# Patient Record
(demographics unavailable — no encounter records)

---

## 2025-04-17 NOTE — HISTORY OF PRESENT ILLNESS
[Gestational Age: ___] : Gestational Age: [unfilled] [Chronological Age: ___] : Chronological Age: [unfilled] [Corrected Age: ___] : Corrected Age: [unfilled] [Date of D/C: ___] : Date of D/C: [unfilled] [Developmental Pediatrics: ___] : Developmental Pediatrics: [unfilled] [Ophthalmology: ___] : Ophthalmology: [unfilled] [Car seat use according to directions] : car seat used according to directions [Weight Gain Since Last Visit (oz/days) ___] : weight gain since last visit: [unfilled] (oz/days)  [___Formula] : [unfilled] [___ ounces/feeding] : ~BRANDI rebolledo/feeding [Every ___ hours] : every [unfilled] hours [_____ Times Per] : Stool frequency occurs [unfilled] times per  [Week] : week [Variable amount] : variable  [Solid Foods] : no solid food at this time [Bloody] : not bloody [Mucousy] : no mucous [de-identified] : d/c [facility] [place] Mother reports baby has been doing well since NICU discharge. Has seen PMD once after hospital discharge, with next appointment in a few weeks for follow up.  Concerns today include gassiness with intermittent constipation, as well as dry skin.  No intercurrent illness.    [de-identified] : NRE score 5.  No EI recommended on discharge gets tummy time, lifts head, looks at face  [de-identified] : None [de-identified] : US for Hips 5/1/25 [de-identified] : NBS with slightly elevated 17 alpha-hydroxyprogesterone.  Conf 17- ng/dL on 3/26/25 [de-identified] : back to sleep [de-identified] : Intermittent hard/paste-like, associated with straining [de-identified] : n/a [de-identified] : n/a [de-identified] : n/a [de-identified] : n/a

## 2025-04-17 NOTE — BIRTH HISTORY
[Birthweight ___ kg] : weight [unfilled] kg [Weight ___ kg] : weight [unfilled] kg [Length ___ cm] : length [unfilled] cm [Head Circumference ___ cm] : head circumference [unfilled] cm [EHM: ___] : EHM: [unfilled] [Formula: ____] : formula: [unfilled] [de-identified] : 30 wk di/di twin B born via c/s and breech to a 36 yo  mother.  Given CPAP in the DR and admitted to NICU on CPAP.  Apgars 7, 8 [de-identified] : RESPIRATORY: RDS. S/p bCPAP. BEYFORTUS 3/25/25. apnea of prematurity, s/p caffeine until 25 CARDIOVASCULAR: Fetal echo wnl. Access history (Type and location): PICC placed -  for nutrition. FEN /GI/Surgical: FE/Enfacare 22kcal POAL HEMATOLOGY:  PRBC Transfusion [ _ ] Yes [ x ] No Platelets transfusion: [ _ ] Yes [ x ] No Phototherapy: Phototherapy given -. Bilirubin stable. G-6PD 15.1 () ENDO: Abnormal  screen result from 25 with c/f congenital adrenal hyperplasia due to slightly elevated 17 alpha-hydroxyprogesterone. Confirmatory 17-OHP level sent 3/26/25 139 ng/dL. ID issues/Septic episodes: Admission BCx. NGTD. S/P Amp/Gent. Neuro:  Last Head US , showing no ICH. ND NRE score 5 and no EI. F/u 6 months. Ophtho: ROP exam 3/17 - S0 Z3 outpatient ophthalmology F/U 6 months Ortho: Breech presentation at birth, recommend hip ultrasound at 44-46 weeks PMA. Immunizations: Received Prevnar (PCV-20) and Pentacel ([id number]) Date: 3/27/25. Received Hepatitis B Date: 3/28/25

## 2025-04-17 NOTE — PATIENT INSTRUCTIONS
[Verbal patient instructions provided] : Verbal patient instructions provided. [FreeTextEntry1] : Return to NICU GRAD clinic on 6/26 at 12:45 PM Needs  Developmental  pediatrics  appt  in  OCTOBER         ( at 6 months Corrected Age )     phone [phone] [FreeTextEntry2] : yes [FreeTextEntry3] : none [FreeTextEntry4] : continue Enfacare 22kcal/oz  [FreeTextEntry5] : continue multivitamin, discontinue iron [FreeTextEntry6] : n/a [FreeTextEntry7] : n/a [FreeTextEntry8] : per PMD [de-identified] : received Beyfortus 3/25 [de-identified] : Aquaphor for skin during winter months  / Aquaphor for skin , avoid  direct sun exposure during summer months [FreeTextEntry9] : n/a [de-identified] : HIP US - obtain around 5/01 [de-identified] : none

## 2025-04-17 NOTE — DISCUSSION/SUMMARY
[GA at Birth: ___] : GA at Birth: [unfilled] [Chronological Age: ___] : Chronological Age: [unfilled] [Corrected Age: ___] : Corrected Age: [unfilled] [Alert] : alert [Quiet] : quiet [Asymmetrical Tonic Neck Reflex (1-3 months)] : asymmetrical tonic neck reflex (1-3 months) [Turns head to both sides (0-2 months)] : turns head to both sides (0-2 months) [Moves extremities equally] : moves extremities equally [Moves against gravity] : moves against gravity [Hands to midline (0-3 months)] : hands to midline (0-3 months) [Turns head side to side] : turns head side to side [Lifts head (45 deg 0-2 mon, 90 deg 1-3 mon)] : lifts head (45 degrees 0-2 months, 90 degrees 1-3 months) [Assist] : sidelying to supine (1.5 - 2 months) - Assist [Passive] : prone to supine (2- 5 months) - Passive [Head mid line] : Head lag (0-2 months) - head in mid line [Fair] : head control is fair [<] : < [Focusing (2 months)] : focusing (2 months) [Tracking (2 months)] : tracking (2 months) [] : no [Supine] : supine [Prone] : prone [Sidelying] : sidelying [FreeTextEntry1] : Prematurity, di-di twin birth, breech  [FreeTextEntry2] : Excessive gassiness  [FreeTextEntry5] : +R c/s rotation preference, no ROM tightness, no plagiocephally noted  [FreeTextEntry3] : Infant seen this am in  followup clinic with infant's mother. Presents with overall strength, ROM, tone, and GM skills appropriate for corrected age. Demonstrates age appropriate state regulation skills.  Reviewed MLO, visual activities, auditory stim, positioning in supine, awake tummy time, awake SL R/L.  Mother provided with handouts with good understanding in addition to good return demo of activities to facilitate gas relief. Emphasized importance of supervised tummy time on floor to facilitate gas relief as pt currently only gets tummy time on mother or on a bed. No EI recommended at this time. Follow up at NICU clinic as per MD ndiaye.

## 2025-04-17 NOTE — REVIEW OF SYSTEMS
[Immunizations are up to date] : Immunizations are up to date [RSV prophylaxis received] : RSV prophylaxis received [Dry Skin] : ~L dry skin [Fatigue] : no fatigue [Fever] : no fever [Decreased Appetite] : no decrease in appetite [Eye Discharge] : no eye discharge [Eye Redness] : no redness [Redness Of Eyelid] : no redness of ~T eyelid [Puffy Eyelids] : no puffy ~T eyelids [Icteric] : were not ~L icteric [Oral Thrush] : no oral thrush [Rhinorrhea] : no rhinorrhea [Sneezing] : no sneezing [Hoarseness] : no hoarseness [Mouth Sores] : no mouth sores [Cyanosis] : no cyanosis [Edema] : no edema [Diaphoresis] : not diaphoretic [Fatigue with Feeding] : no fatigue with feeding [Difficulty Breathing] : no dyspnea [Cough] : no cough [Wheezing] : no wheezing [Vomiting] : no vomiting [Diarrhea] : no diarrhea [Arching with Feeds] : no arching with feeds [Regurgitation] : no regurgitation [Seizure] : no seizures [Joint Swelling] : no joint swelling [Abnormal Movements] : no abnormal movements [Dec Urine Output] : no oliguria [Swelling in Scrotum] : no swelling in scrotum [Urticaria] : no urticaria [Yellow Skin Color] : skin not yellow [Pale Skin Color] : skin is not pale [Rash] : no rash [Blood in Stools] : no blood in stools [Skin Rash] : no skin rash

## 2025-04-17 NOTE — PATIENT INSTRUCTIONS
[Verbal patient instructions provided] : Verbal patient instructions provided. [FreeTextEntry1] : Return to NICU GRAD clinic on 6/26 at 12:45 PM Needs  Developmental  pediatrics  appt  in  OCTOBER         ( at 6 months Corrected Age )     phone [phone] [FreeTextEntry2] : yes [FreeTextEntry3] : none [FreeTextEntry4] : continue Enfacare 22kcal/oz  [FreeTextEntry5] : continue multivitamin, discontinue iron [FreeTextEntry6] : n/a [FreeTextEntry7] : n/a [FreeTextEntry8] : per PMD [de-identified] : received Beyfortus 3/25 [FreeTextEntry9] : n/a [de-identified] : Aquaphor for skin during winter months  / Aquaphor for skin , avoid  direct sun exposure during summer months [de-identified] : HIP US - obtain around 5/01 [de-identified] : none

## 2025-04-17 NOTE — BIRTH HISTORY
[Birthweight ___ kg] : weight [unfilled] kg [Weight ___ kg] : weight [unfilled] kg [Length ___ cm] : length [unfilled] cm [Head Circumference ___ cm] : head circumference [unfilled] cm [EHM: ___] : EHM: [unfilled] [Formula: ____] : formula: [unfilled] [de-identified] : 30 wk di/di twin B born via c/s and breech to a 36 yo  mother.  Given CPAP in the DR and admitted to NICU on CPAP.  Apgars 7, 8 [de-identified] : RESPIRATORY: RDS. S/p bCPAP. BEYFORTUS 3/25/25. apnea of prematurity, s/p caffeine until 25 CARDIOVASCULAR: Fetal echo wnl. Access history (Type and location): PICC placed -  for nutrition. FEN /GI/Surgical: FE/Enfacare 22kcal POAL HEMATOLOGY:  PRBC Transfusion [ _ ] Yes [ x ] No Platelets transfusion: [ _ ] Yes [ x ] No Phototherapy: Phototherapy given -. Bilirubin stable. G-6PD 15.1 () ENDO: Abnormal  screen result from 25 with c/f congenital adrenal hyperplasia due to slightly elevated 17 alpha-hydroxyprogesterone. Confirmatory 17-OHP level sent 3/26/25 139 ng/dL. ID issues/Septic episodes: Admission BCx. NGTD. S/P Amp/Gent. Neuro:  Last Head US , showing no ICH. ND NRE score 5 and no EI. F/u 6 months. Ophtho: ROP exam 3/17 - S0 Z3 outpatient ophthalmology F/U 6 months Ortho: Breech presentation at birth, recommend hip ultrasound at 44-46 weeks PMA. Immunizations: Received Prevnar (PCV-20) and Pentacel ([id number]) Date: 3/27/25. Received Hepatitis B Date: 3/28/25

## 2025-04-17 NOTE — REVIEW OF SYSTEMS
[Immunizations are up to date] : Immunizations are up to date [RSV prophylaxis received] : RSV prophylaxis received [Dry Skin] : ~L dry skin [Fatigue] : no fatigue [Fever] : no fever [Decreased Appetite] : no decrease in appetite [Eye Discharge] : no eye discharge [Eye Redness] : no redness [Redness Of Eyelid] : no redness of ~T eyelid [Puffy Eyelids] : no puffy ~T eyelids [Icteric] : were not ~L icteric [Oral Thrush] : no oral thrush [Rhinorrhea] : no rhinorrhea [Sneezing] : no sneezing [Hoarseness] : no hoarseness [Mouth Sores] : no mouth sores [Cyanosis] : no cyanosis [Edema] : no edema [Diaphoresis] : not diaphoretic [Fatigue with Feeding] : no fatigue with feeding [Difficulty Breathing] : no dyspnea [Cough] : no cough [Wheezing] : no wheezing [Vomiting] : no vomiting [Diarrhea] : no diarrhea [Arching with Feeds] : no arching with feeds [Regurgitation] : no regurgitation [Seizure] : no seizures [Joint Swelling] : no joint swelling [Abnormal Movements] : no abnormal movements [Dec Urine Output] : no oliguria [Swelling in Scrotum] : no swelling in scrotum [Urticaria] : no urticaria [Yellow Skin Color] : skin not yellow [Pale Skin Color] : skin is not pale [Rash] : no rash [Skin Rash] : no skin rash [Blood in Stools] : no blood in stools

## 2025-04-17 NOTE — PHYSICAL EXAM
[Pink] : pink [Well Perfused] : well perfused [Conjunctiva Clear] : conjunctiva clear [Red Reflex Present] : red reflex present [PERRL] : pupils were equal, round, reactive to light  [Nares Patent] : nares patent [Ears Normal Position and Shape] : normal position and shape of ears [No Nasal Flaring] : no nasal flaring [Moist and Pink Mucous Membranes] : moist and pink mucous membranes [Palate Intact] : palate intact [No Torticollis] : no torticollis [No Neck Masses] : no neck masses [Symmetric Expansion] : symmetric chest expansion [No Retractions] : no retractions [Clear to Auscultation] : lungs clear to auscultation  [Normal S1, S2] : normal S1 and S2 [Regular Rhythm] : regular rhythm [No Murmur] : no mumur [Normal Pulses] : normal pulses [Non Distended] : non distended [No HSM] : no hepatosplenomegaly appreciated [No Masses] : no masses were palpated [Normal Bowel Sounds] : normal bowel sounds [No Umbilical Hernia] : no umbilical hernia [Normal Genitalia] : normal genitalia [No Sacral Dimples] : no sacral dimples [No Scoliosis] : no scoliosis [Normal Range of Motion] : normal range of motion [Normal Posture] : normal posture [No evidence of Hip Dislocation] : no evidence of hip dislocation [Active and Alert] : active and alert [Normal muscle tone] : normal muscle tone of all extremites [Normal truncal tone] : normal truncal tone [Normal deep tendon reflexes] : normal deep tendon reflexes [Symmetric Sujit] : the [place] reflex was ~L present [Palmar Grasp] : the palmar grasp reflex was ~L present [Plantar Grasp] : the plantar grasp reflex was ~L present [Strong Suck] : the strong sucking reflex was ~L present [Rooting] : the rooting reflex was ~L present [ATNR] : tonic neck reflex was ~L asymmetrical [Fixes On Faces] : fixes on faces [Follows to Midline] : the gaze follows to the midline [Follows Past Midline] : the gaze follows past the midline [Turns Head Side to Side in Prone] : turns head side to side in prone [Lifts Head And Chest 30 degress in Prone] : lifts the head and chest 30 degress in prone [Lifts Head And Chest 45 degress in Prone] : lifts the head and chest 45 degress in prone [Follows 180 Degrees] : visual track 180 degrees not achieved [Weight Shifts in Prone] : does not weight shift in prone [Reaches For Objects in Prone] : does not reach for objects in prone [Hands Open] : the hands open [Reaches for Objects] : does not reach for objects [Swats at Objects] : does not swat at objects [de-identified] : dry skin across extremities [de-identified] : circumcised, testes descended bilaterally [de-identified] : + head lag (age-appropriate) , rightward head preference but no plagio or torticollis , able to look to left as well

## 2025-04-17 NOTE — HISTORY OF PRESENT ILLNESS
[Gestational Age: ___] : Gestational Age: [unfilled] [Chronological Age: ___] : Chronological Age: [unfilled] [Corrected Age: ___] : Corrected Age: [unfilled] [Date of D/C: ___] : Date of D/C: [unfilled] [Developmental Pediatrics: ___] : Developmental Pediatrics: [unfilled] [Ophthalmology: ___] : Ophthalmology: [unfilled] [Car seat use according to directions] : car seat used according to directions [Weight Gain Since Last Visit (oz/days) ___] : weight gain since last visit: [unfilled] (oz/days)  [___Formula] : [unfilled] [___ ounces/feeding] : ~BRANDI rebolledo/feeding [Every ___ hours] : every [unfilled] hours [_____ Times Per] : Stool frequency occurs [unfilled] times per  [Week] : week [Variable amount] : variable  [Solid Foods] : no solid food at this time [Bloody] : not bloody [Mucousy] : no mucous [de-identified] : d/c [facility] Mother reports baby has been doing well since NICU discharge. Has seen PMD once after hospital discharge, with next appointment in a few weeks for follow up.  Concerns today include gassiness with intermittent constipation, as well as dry skin.  No intercurrent illness.    [de-identified] : NRE score 5.  No EI recommended on discharge gets tummy time, lifts head, looks at face  [de-identified] : None [de-identified] : NBS with slightly elevated 17 alpha-hydroxyprogesterone.  Conf 17- ng/dL on 3/26/25 [de-identified] : US for Hips 5/1/25 [de-identified] : back to sleep [de-identified] : Intermittent hard/paste-like, associated with straining [de-identified] : n/a [de-identified] : n/a [de-identified] : n/a [de-identified] : n/a

## 2025-04-17 NOTE — HISTORY OF PRESENT ILLNESS
[Gestational Age: ___] : Gestational Age: [unfilled] [Chronological Age: ___] : Chronological Age: [unfilled] [Corrected Age: ___] : Corrected Age: [unfilled] [Date of D/C: ___] : Date of D/C: [unfilled] [Developmental Pediatrics: ___] : Developmental Pediatrics: [unfilled] [Ophthalmology: ___] : Ophthalmology: [unfilled] [Car seat use according to directions] : car seat used according to directions [Weight Gain Since Last Visit (oz/days) ___] : weight gain since last visit: [unfilled] (oz/days)  [___Formula] : [unfilled] [___ ounces/feeding] : ~BRANDI rebolledo/feeding [Every ___ hours] : every [unfilled] hours [_____ Times Per] : Stool frequency occurs [unfilled] times per  [Week] : week [Variable amount] : variable  [Solid Foods] : no solid food at this time [Bloody] : not bloody [Mucousy] : no mucous [de-identified] : d/c [facility] [place] Mother reports baby has been doing well since NICU discharge. Has seen PMD once after hospital discharge, with next appointment in a few weeks for follow up.  Concerns today include gassiness with intermittent constipation, as well as dry skin.  No intercurrent illness.    [de-identified] : NRE score 5.  No EI recommended on discharge gets tummy time, lifts head, looks at face  [de-identified] : None [de-identified] : US for Hips 5/1/25 [de-identified] : NBS with slightly elevated 17 alpha-hydroxyprogesterone.  Conf 17- ng/dL on 3/26/25 [de-identified] : back to sleep [de-identified] : Intermittent hard/paste-like, associated with straining [de-identified] : n/a [de-identified] : n/a [de-identified] : n/a [de-identified] : n/a

## 2025-04-17 NOTE — HISTORY OF PRESENT ILLNESS
[Gestational Age: ___] : Gestational Age: [unfilled] [Chronological Age: ___] : Chronological Age: [unfilled] [Corrected Age: ___] : Corrected Age: [unfilled] [Date of D/C: ___] : Date of D/C: [unfilled] [Developmental Pediatrics: ___] : Developmental Pediatrics: [unfilled] [Ophthalmology: ___] : Ophthalmology: [unfilled] [Car seat use according to directions] : car seat used according to directions [Weight Gain Since Last Visit (oz/days) ___] : weight gain since last visit: [unfilled] (oz/days)  [___Formula] : [unfilled] [___ ounces/feeding] : ~BRANDI rebolledo/feeding [Every ___ hours] : every [unfilled] hours [_____ Times Per] : Stool frequency occurs [unfilled] times per  [Week] : week [Variable amount] : variable  [Solid Foods] : no solid food at this time [Bloody] : not bloody [Mucousy] : no mucous [de-identified] : d/c [facility] [place] Mother reports baby has been doing well since NICU discharge. Has seen PMD once after hospital discharge, with next appointment in a few weeks for follow up.  Concerns today include gassiness with intermittent constipation, as well as dry skin.  No intercurrent illness.    [de-identified] : NRE score 5.  No EI recommended on discharge gets tummy time, lifts head, looks at face  [de-identified] : None [de-identified] : US for Hips 5/1/25 [de-identified] : NBS with slightly elevated 17 alpha-hydroxyprogesterone.  Conf 17- ng/dL on 3/26/25 [de-identified] : back to sleep [de-identified] : Intermittent hard/paste-like, associated with straining [de-identified] : n/a [de-identified] : n/a [de-identified] : n/a [de-identified] : n/a

## 2025-04-17 NOTE — BIRTH HISTORY
[Birthweight ___ kg] : weight [unfilled] kg [Weight ___ kg] : weight [unfilled] kg [Length ___ cm] : length [unfilled] cm [Head Circumference ___ cm] : head circumference [unfilled] cm [EHM: ___] : EHM: [unfilled] [Formula: ____] : formula: [unfilled] [de-identified] : 30 wk di/di twin B born via c/s and breech to a 38 yo  mother.  Given CPAP in the DR and admitted to NICU on CPAP.  Apgars 7, 8 [de-identified] : RESPIRATORY: RDS. S/p bCPAP. BEYFORTUS 3/25/25. apnea of prematurity, s/p caffeine until 25 CARDIOVASCULAR: Fetal echo wnl. Access history (Type and location): PICC placed -  for nutrition. FEN /GI/Surgical: FE/Enfacare 22kcal POAL HEMATOLOGY:  PRBC Transfusion [ _ ] Yes [ x ] No Platelets transfusion: [ _ ] Yes [ x ] No Phototherapy: Phototherapy given -. Bilirubin stable. G-6PD 15.1 () ENDO: Abnormal  screen result from 25 with c/f congenital adrenal hyperplasia due to slightly elevated 17 alpha-hydroxyprogesterone. Confirmatory 17-OHP level sent 3/26/25 139 ng/dL. ID issues/Septic episodes: Admission BCx. NGTD. S/P Amp/Gent. Neuro:  Last Head US , showing no ICH. ND NRE score 5 and no EI. F/u 6 months. Ophtho: ROP exam 3/17 - S0 Z3 outpatient ophthalmology F/U 6 months Ortho: Breech presentation at birth, recommend hip ultrasound at 44-46 weeks PMA. Immunizations: Received Prevnar (PCV-20) and Pentacel ([id number]) Date: 3/27/25. Received Hepatitis B Date: 3/28/25

## 2025-04-17 NOTE — PHYSICAL EXAM
[Pink] : pink [Well Perfused] : well perfused [Conjunctiva Clear] : conjunctiva clear [Red Reflex Present] : red reflex present [PERRL] : pupils were equal, round, reactive to light  [Nares Patent] : nares patent [Ears Normal Position and Shape] : normal position and shape of ears [No Nasal Flaring] : no nasal flaring [Moist and Pink Mucous Membranes] : moist and pink mucous membranes [Palate Intact] : palate intact [No Torticollis] : no torticollis [No Neck Masses] : no neck masses [Symmetric Expansion] : symmetric chest expansion [No Retractions] : no retractions [Clear to Auscultation] : lungs clear to auscultation  [Normal S1, S2] : normal S1 and S2 [Regular Rhythm] : regular rhythm [No Murmur] : no mumur [Normal Pulses] : normal pulses [Non Distended] : non distended [No HSM] : no hepatosplenomegaly appreciated [No Masses] : no masses were palpated [Normal Bowel Sounds] : normal bowel sounds [No Umbilical Hernia] : no umbilical hernia [Normal Genitalia] : normal genitalia [No Sacral Dimples] : no sacral dimples [No Scoliosis] : no scoliosis [Normal Range of Motion] : normal range of motion [Normal Posture] : normal posture [No evidence of Hip Dislocation] : no evidence of hip dislocation [Active and Alert] : active and alert [Normal muscle tone] : normal muscle tone of all extremites [Normal truncal tone] : normal truncal tone [Normal deep tendon reflexes] : normal deep tendon reflexes [Symmetric Sujit] : the [place] reflex was ~L present [Palmar Grasp] : the palmar grasp reflex was ~L present [Plantar Grasp] : the plantar grasp reflex was ~L present [Strong Suck] : the strong sucking reflex was ~L present [Rooting] : the [place] reflex was ~L present [ATNR] : tonic neck reflex was ~L asymmetrical [Fixes On Faces] : fixes on faces [Follows to Midline] : the gaze follows to the midline [Follows Past Midline] : the gaze follows past the midline [Turns Head Side to Side in Prone] : turns head side to side in prone [Lifts Head And Chest 30 degress in Prone] : lifts the head and chest 30 degress in prone [Lifts Head And Chest 45 degress in Prone] : lifts the head and chest 45 degress in prone [Follows 180 Degrees] : visual track 180 degrees not achieved [Weight Shifts in Prone] : does not weight shift in prone [Reaches For Objects in Prone] : does not reach for objects in prone [Hands Open] : the hands open [Reaches for Objects] : does not reach for objects [Swats at Objects] : does not swat at objects [de-identified] : dry skin across extremities [de-identified] : circumcised, testes descended bilaterally [de-identified] : + head lag (age-appropriate) , rightward head preference but no plagio or torticollis , able to look to left as well

## 2025-04-17 NOTE — REASON FOR VISIT
[F/U - Hospitalization] : follow-up of a recent hospitalization for [Chronic Lung Disease] : chronic lung disease [Developmental Delay] : developmental delay [Mother] : mother

## 2025-04-17 NOTE — PHYSICAL EXAM
[Pink] : pink [Well Perfused] : well perfused [Conjunctiva Clear] : conjunctiva clear [Red Reflex Present] : red reflex present [PERRL] : pupils were equal, round, reactive to light  [Nares Patent] : nares patent [Ears Normal Position and Shape] : normal position and shape of ears [Moist and Pink Mucous Membranes] : moist and pink mucous membranes [No Nasal Flaring] : no nasal flaring [Palate Intact] : palate intact [No Torticollis] : no torticollis [No Neck Masses] : no neck masses [Symmetric Expansion] : symmetric chest expansion [No Retractions] : no retractions [Clear to Auscultation] : lungs clear to auscultation  [Normal S1, S2] : normal S1 and S2 [Regular Rhythm] : regular rhythm [No Murmur] : no mumur [Normal Pulses] : normal pulses [Non Distended] : non distended [No HSM] : no hepatosplenomegaly appreciated [No Masses] : no masses were palpated [Normal Bowel Sounds] : normal bowel sounds [No Umbilical Hernia] : no umbilical hernia [Normal Genitalia] : normal genitalia [No Sacral Dimples] : no sacral dimples [No Scoliosis] : no scoliosis [Normal Range of Motion] : normal range of motion [Normal Posture] : normal posture [No evidence of Hip Dislocation] : no evidence of hip dislocation [Active and Alert] : active and alert [Normal muscle tone] : normal muscle tone of all extremites [Normal truncal tone] : normal truncal tone [Normal deep tendon reflexes] : normal deep tendon reflexes [Symmetric Sujit] : the [place] reflex was ~L present [Palmar Grasp] : the palmar grasp reflex was ~L present [Plantar Grasp] : the plantar grasp reflex was ~L present [Strong Suck] : the strong sucking reflex was ~L present [Rooting] : the rooting reflex was ~L present [ATNR] : tonic neck reflex was ~L asymmetrical [Fixes On Faces] : fixes on faces [Follows to Midline] : the gaze follows to the midline [Follows Past Midline] : the gaze follows past the midline [Turns Head Side to Side in Prone] : turns head side to side in prone [Lifts Head And Chest 30 degress in Prone] : lifts the head and chest 30 degress in prone [Lifts Head And Chest 45 degress in Prone] : lifts the head and chest 45 degress in prone [Follows 180 Degrees] : visual track 180 degrees not achieved [Weight Shifts in Prone] : does not weight shift in prone [Reaches For Objects in Prone] : does not reach for objects in prone [Hands Open] : the hands open [Reaches for Objects] : does not reach for objects [Swats at Objects] : does not swat at objects [de-identified] : dry skin across extremities [de-identified] : circumcised, testes descended bilaterally [de-identified] : + head lag (age-appropriate) , rightward head preference but no plagio or torticollis , able to look to left as well

## 2025-04-17 NOTE — PATIENT INSTRUCTIONS
[Verbal patient instructions provided] : Verbal patient instructions provided. [FreeTextEntry1] : Return to NICU GRAD clinic on 6/26 at 12:45 PM Needs  Developmental  pediatrics  appt  in  OCTOBER         ( at 6 months Corrected Age )     phone [phone] [FreeTextEntry2] : yes [FreeTextEntry3] : none [FreeTextEntry4] : continue Enfacare 22kcal/oz  [FreeTextEntry5] : continue multivitamin, discontinue iron [FreeTextEntry6] : n/a [FreeTextEntry7] : n/a [FreeTextEntry8] : per PMD [de-identified] : received Beyfortus 3/25 [FreeTextEntry9] : n/a [de-identified] : Aquaphor for skin during winter months  / Aquaphor for skin , avoid  direct sun exposure during summer months [de-identified] : HIP US - obtain around 5/01 [de-identified] : none

## 2025-04-17 NOTE — PHYSICAL EXAM
[Pink] : pink [Well Perfused] : well perfused [Conjunctiva Clear] : conjunctiva clear [Red Reflex Present] : red reflex present [PERRL] : pupils were equal, round, reactive to light  [Nares Patent] : nares patent [Ears Normal Position and Shape] : normal position and shape of ears [No Nasal Flaring] : no nasal flaring [Moist and Pink Mucous Membranes] : moist and pink mucous membranes [Palate Intact] : palate intact [No Torticollis] : no torticollis [No Neck Masses] : no neck masses [Symmetric Expansion] : symmetric chest expansion [No Retractions] : no retractions [Clear to Auscultation] : lungs clear to auscultation  [Normal S1, S2] : normal S1 and S2 [Regular Rhythm] : regular rhythm [No Murmur] : no mumur [Normal Pulses] : normal pulses [Non Distended] : non distended [No HSM] : no hepatosplenomegaly appreciated [No Masses] : no masses were palpated [Normal Bowel Sounds] : normal bowel sounds [No Umbilical Hernia] : no umbilical hernia [Normal Genitalia] : normal genitalia [No Sacral Dimples] : no sacral dimples [No Scoliosis] : no scoliosis [Normal Range of Motion] : normal range of motion [Normal Posture] : normal posture [No evidence of Hip Dislocation] : no evidence of hip dislocation [Active and Alert] : active and alert [Normal muscle tone] : normal muscle tone of all extremites [Normal truncal tone] : normal truncal tone [Normal deep tendon reflexes] : normal deep tendon reflexes [Symmetric Sujit] : the [place] reflex was ~L present [Palmar Grasp] : the palmar grasp reflex was ~L present [Plantar Grasp] : the plantar grasp reflex was ~L present [Strong Suck] : the strong sucking reflex was ~L present [Rooting] : the rooting reflex was ~L present [ATNR] : tonic neck reflex was ~L asymmetrical [Fixes On Faces] : fixes on faces [Follows to Midline] : the gaze follows to the midline [Follows Past Midline] : the gaze follows past the midline [Turns Head Side to Side in Prone] : turns head side to side in prone [Lifts Head And Chest 30 degress in Prone] : lifts the head and chest 30 degress in prone [Lifts Head And Chest 45 degress in Prone] : lifts the head and chest 45 degress in prone [Follows 180 Degrees] : visual track 180 degrees not achieved [Weight Shifts in Prone] : does not weight shift in prone [Reaches For Objects in Prone] : does not reach for objects in prone [Hands Open] : the hands open [Reaches for Objects] : does not reach for objects [Swats at Objects] : does not swat at objects [de-identified] : dry skin across extremities [de-identified] : circumcised, testes descended bilaterally [de-identified] : + head lag (age-appropriate) , rightward head preference but no plagio or torticollis , able to look to left as well

## 2025-04-17 NOTE — ASSESSMENT
[FreeTextEntry1] : BRIDGET MORA  is a 30 week gestation infant, now chronologic age 2 mo 27 days , corrected age 42.1 weeks, seen in  follow-up for prematurity. Pertinent NICU history includes twin, RDS, slow feeding, breech presentation.  The following issues were addressed at this visit.  Growth and nutrition: Weight gain has been  18 oz/  20 days and plots at the 13th percentile for corrected age.  Head growth and length are at the 61st and 63rd percentiles respectively.  Baby is currently feeding Enfacare 22kcal/oz ad aisha (taking 3 oz every 2-3 hrs) and the plan is to continue in the setting of adequate weight gain . Due to prematurity, solid foods are not recommended until 5-6 months corrected age with good head control. Labs to be obtained today: none. Continue vitamin supplements.  Development/neuro: baby at risk for developmental delay for chronologic age, was seen by OT today and given home exercises to do. Baby also has rightward head  preference but no plagiocepahly or torticollis, discussed tummy time and manuevers to obtain other cervical rotational positions. Early Intervention is not needed at this time.  Baby will follow-up with pediatric developmental in October (needs appt).  Anemia: Baby has been on iron supplements and will continue Hct reviewed and is appropriate for age. In the setting of full formula feeds, and reports of constipation, recommend discontinue iron supplement.    ROP: Baby is at risk for ROP and other ophthalmologic complications due to prematurity and will follow with ophthalmology 25. Parents informed of importance of ophtho follow-up.    Breech presentation at birth: Infant is at risk for developmental dysplasia of the the hips. Hip US to be done between 44-46 weeks corrected age.  Script given.   Other:   Health maintenance: Reviewed routine vaccination schedule with parent as well as guidance for flu vaccine for family, COVID-19 precautions, and need for PMD f/u.  Also discussed bathing and skin care recommendations.   Reviewed notes by (other services): NICU discharge summary   Next neonatology f/u:  at 12:45 PM along with the twin

## 2025-04-17 NOTE — BIRTH HISTORY
[Birthweight ___ kg] : weight [unfilled] kg [Weight ___ kg] : weight [unfilled] kg [Length ___ cm] : length [unfilled] cm [Head Circumference ___ cm] : head circumference [unfilled] cm [EHM: ___] : EHM: [unfilled] [Formula: ____] : formula: [unfilled] [de-identified] : 30 wk di/di twin B born via c/s and breech to a 38 yo  mother.  Given CPAP in the DR and admitted to NICU on CPAP.  Apgars 7, 8 [de-identified] : RESPIRATORY: RDS. S/p bCPAP. BEYFORTUS 3/25/25. apnea of prematurity, s/p caffeine until 25 CARDIOVASCULAR: Fetal echo wnl. Access history (Type and location): PICC placed -  for nutrition. FEN /GI/Surgical: FE/Enfacare 22kcal POAL HEMATOLOGY:  PRBC Transfusion [ _ ] Yes [ x ] No Platelets transfusion: [ _ ] Yes [ x ] No Phototherapy: Phototherapy given -. Bilirubin stable. G-6PD 15.1 () ENDO: Abnormal  screen result from 25 with c/f congenital adrenal hyperplasia due to slightly elevated 17 alpha-hydroxyprogesterone. Confirmatory 17-OHP level sent 3/26/25 139 ng/dL. ID issues/Septic episodes: Admission BCx. NGTD. S/P Amp/Gent. Neuro:  Last Head US , showing no ICH. ND NRE score 5 and no EI. F/u 6 months. Ophtho: ROP exam 3/17 - S0 Z3 outpatient ophthalmology F/U 6 months Ortho: Breech presentation at birth, recommend hip ultrasound at 44-46 weeks PMA. Immunizations: Received Prevnar (PCV-20) and Pentacel (ESnJ-RCD-FsJ) Date: 3/27/25. Received Hepatitis B Date: 3/28/25

## 2025-04-17 NOTE — PATIENT INSTRUCTIONS
[Verbal patient instructions provided] : Verbal patient instructions provided. [FreeTextEntry1] : Return to NICU GRAD clinic on 6/26 at 12:45 PM Needs  Developmental  pediatrics  appt  in  OCTOBER         ( at 6 months Corrected Age )     phone -[phone] [FreeTextEntry2] : yes [FreeTextEntry3] : none [FreeTextEntry4] : continue Enfacare 22kcal/oz  [FreeTextEntry5] : continue multivitamin, discontinue iron [FreeTextEntry6] : n/a [FreeTextEntry7] : n/a [FreeTextEntry8] : per PMD [de-identified] : received Beyfortus 3/25 [de-identified] : Aquaphor for skin during winter months  / Aquaphor for skin , avoid  direct sun exposure during summer months [FreeTextEntry9] : n/a [de-identified] : HIP US - obtain around 5/01 [de-identified] : none

## 2025-05-01 NOTE — END OF VISIT
[FreeTextEntry3] : I, Khari Rodríguez MD, I personally performed the services described in the documentation, reviewed the documentation recorded by the scribe in my presence and it accurately and completely records my words and actions

## 2025-05-01 NOTE — REVIEW OF SYSTEMS
[Change in Activity] : no change in activity [Fever Above 102] : no fever [Rash] : no rash [Change in Appetite] : no change in appetite [Joint Pains] : no arthralgias

## 2025-05-01 NOTE — HISTORY OF PRESENT ILLNESS
[FreeTextEntry1] : 3 month male born at 30 weeks via  in breech position presents for evaluation for hip dysplasia. Patient was in NICU for 2 months and 10 days after birth.

## 2025-05-01 NOTE — PHYSICAL EXAM
[FreeTextEntry1] : Well-developed, well-nourished male in no acute distress.  Patient is awake and alert and appears to be resting comfortably.  The head is normocephalic, atraumatic with full range of motion of the cervical spine with no pain. Eyes are clear with no sclera abnormalities. Ears, nose and mouth are clear.   The child is moving all limbs spontaneously.  Full range of motion of bilateral upper extremities.  Moving b/l upper and lower extremities.  The pulses are 2+ at both wrists.   The child has full range of motion of bilateral hips, knees, ankles, and feet. No apparent limb length discrepancy.  Negative Ortolani, negative Messer. Sensation is grossly intact in bilateral upper and lower extremities.  Pulses are 2+ at both feet.  There are no palpable masses, warmth, or tenderness in bilateral upper and lower extremities.  Spine is grossly midline and shows no deformity, denisse of hair or dimples

## 2025-05-01 NOTE — ASSESSMENT
[FreeTextEntry1] : 3 month male born at 30 weeks via  in breech position presents for evaluation for concerns of  hip dysplasia d/t breech pregnancy Today's visit included obtaining history from the child  parent due to the child's age, the child could not be considered a reliable historian, requiring parent to act as independent historian. . Discussed with mother that exam is reassuring, however, we should obtain ultrasound to evaluate the hips due to breech position. Advised to scheduled ASAP once auth is obtained. Patient's mother expressed understanding and all questions were answered. Follow up after US  I, Binh Chapman, acted as scribe for Dr. Khari Rodríguez.

## 2025-07-02 NOTE — ASSESSMENT
[FreeTextEntry1] : BRIDGET CHACON is a 30.3 week gestation infant, Twin B, now chronologic age 5 mos. 18 days, corrected age 3 mos. 10 days seen in  follow-up. Pertinent NICU history includes Prematurity, 30 wks., Di-Di Twin Gestation, Breech Delivery, Respiratory Failure due to RDS, Apnea of prematurity, Hyperbilirubinemia.  The following issues were addressed at this visit.  Growth and nutrition: Weight gain has been  ___ oz/  ____  days and plots at the ____ percentile for corrected age.  Head growth and length are at the ___ and ___ percentiles respectively.  Baby is currently feeding _______  and the plan is to continue ______  until ______ because of _______ . Due to prematurity, solid foods are not recommended until 5-6 months corrected age with good head control. Labs to be obtained today _________ . Continue vitamin supplements.  Development/neuro: baby has developmental delay for chronologic age, was seen by PT/OT today and given home exercises to do. Baby also has  ( list other  neuro abnormalities here). Early Intervention is recommended/is not needed at this time.  Baby will follow-up with pediatric developmental in ______ .   Anemia: Baby has been on iron supplements and will continue/discontinue/increase to ____ . Hct reviewed and is appropriate for age OR  Will check hct/retic today.   CLD: Infant has chronic lung disease. O2 sats ______.  Plan to continue diuril/carospir/ _____ . Oxygen supplementation needed/not needed/adjusted to _____ . Plan to follow with pulmonology. Baby is/is not a candidate for Synagis and will receive next dose  ______ by ______ .   FLAVIA: Baby has signs of  FLAVIA and plan to adjust medication to _____ . Reviewed non-pharmacologic methods to reduce symptoms including _________ .   ROP: Baby is at risk for ROP and other ophthalmologic complications due to prematurity and will follow with ophthalmology _______  . Parents informed of importance of ophtho follow-up.    Inguinal hernia/umbilical hernia observed and easily reducible.  Reviewed signs of incarceration with parent. Consider delaying inguinal hernia repair beyond 52 weeks corrected age or until off O2. baby to be followed by peds surgery.  OR No need for intervention for umbilical hernia as these usually regress spontaneously.  Breech presentation at birth: Infant is at risk for developmental dysplasia of the the hips. Hip US on 25 reviewed and is normal.   Other:   Health maintenance: Reviewed routine vaccination schedule with parent as well as guidance for flu vaccine for family, COVID-19 precautions, and need for PMD f/u.  Also discussed bathing and skin care recommendations.   Reviewed notes by (other services)   Next neonatology f/u: _________ .

## 2025-07-02 NOTE — BIRTH HISTORY
[Birthweight ___ kg] : weight [unfilled] kg [Weight ___ kg] : weight [unfilled] kg [Length ___ cm] : length [unfilled] cm [Head Circumference ___ cm] : head circumference [unfilled] cm [Formula: ____] : formula: [unfilled] [de-identified] : 30wk di-di TWIN B male born via unscheduled CS 2/2  labor, breech to a 36y/o  blood type A+ mother. Maternal history of anemia (on iron), hypothyroidism (on levothyroxine), insufficient cervix (on progesterone), SMA carrier. Prenatal history of IVF pregnancy. Mother received beta (complete), antibiotics, magnesium. PNL HIV -/Hep B-/RPR non-reactive/Rubella immune, urine GBS +/ on 24. AROM @time of delivery with clear fluids. NICU called for prematurity, twin delivery, breech presentation. Baby emerged breech vigorous, crying, was w/d/s/s with APGARS of 7/8, resuscitation included CPAP 5/30%, deep suction, admitted to NICU for prematurity and CPAP requirement. Highest maternal temp 36.7C, EOS n/a. [de-identified] : Prematurity Di-Di Twin Gestation Breech delivery Respiratory Failure due roger RDS AOP Hyperbilirubinemia

## 2025-07-02 NOTE — REASON FOR VISIT
[Follow-Up] : a follow-up visit for [Weight Check] : weight check [Developmental Delay] : developmental delay [Medical Records] : medical records

## 2025-07-02 NOTE — HISTORY OF PRESENT ILLNESS
[EDC: ___] : EDC: [unfilled] [Gestational Age: ___] : Gestational Age: [unfilled] [Chronological Age: ___] : Chronological Age: [unfilled] [Corrected Age: ___] : Corrected Age: [unfilled] [Date of D/C: ___] : Date of D/C: [unfilled] [Developmental Pediatrics: ___] : Developmental Pediatrics: [unfilled] [Ophthalmology: ___] : Ophthalmology: [unfilled] [de-identified] : [place] [facility] [place] [de-identified] : NRE 5  No EI F/U NICU Grad Clinic & Developmental Peds clinic [de-identified] : n/a [de-identified] : n/a

## 2025-07-02 NOTE — PATIENT INSTRUCTIONS
[Verbal patient instructions provided] : Verbal patient instructions provided. [FreeTextEntry1] : F/U Pediatric developmental Clinic on 9/15/25 F/U Eye MD on 9/17/25 [FreeTextEntry8] : PMD to do [de-identified] : Skin care instructions provided.  Aquaphor for dry skin, use only unscented baby care products, avoid direct sunlight especially during the summer montths

## 2025-07-10 NOTE — BIRTH HISTORY
[de-identified] : 30wk di-di TWIN B male born via unscheduled CS 2/2  labor, breech to a 36y/o  blood type A+ mother. Maternal history of anemia (on iron), hypothyroidism (on levothyroxine), insufficient cervix (on progesterone), SMA carrier. Prenatal history of IVF pregnancy. Mother received beta (complete), antibiotics, magnesium. PNL HIV -/Hep B-/RPR non-reactive/Rubella immune, urine GBS +/ on 24. AROM @time of delivery with clear fluids. NICU called for prematurity, twin delivery, breech presentation. Baby emerged breech vigorous, crying, was w/d/s/s with APGARS of 7/8, resuscitation included CPAP 5/30%, deep suction, admitted to NICU for prematurity and CPAP requirement. Highest maternal temp 36.7C, EOS n/a. [de-identified] : Prematurity Di-Di Twin Gestation Breech delivery Respiratory Failure due roger RDS AOP Hyperbilirubinemia

## 2025-07-10 NOTE — HISTORY OF PRESENT ILLNESS
[Car seat use according to directions] : car seat used according to directions [No Feeding Issues] : no feeding issues at this time [___Formula] : [unfilled] [___ ounces/feeding] : ~BRANDI rebolledo/feeding [___ Times/day] : [unfilled] times/day [_____ Times Per] : Stool frequency occurs [unfilled] times per  [Day] : day [Variable amount] : variable  [Soft] : soft [Solid Foods] : no solid food at this time [Bloody] : not bloody [Mucousy] : no mucous [de-identified] : [place] [facility] [place]: No hospitalizations  Diet: Feeding Enfacare 22, 4oz; 10 feeds per day.  Diapers - 1 stool per day, no blood or mucus Car seat rear facing Sleeping in crib on back. [de-identified] : NRE 5  No EI F/U NICU Grad Clinic & Developmental Peds clinic Social Language and Self-help  Smiles responsively; makes sounds that show happiness/upset; Laughs aloud  Looks for parent or another caregiver when upset  Verbal Language (Expressive and Receptive)  Makes short cooing sounds; Turns to voices  Makes extended cooing sounds  Gross Motor  Does not Lifts head and chest when on stomach  Does not Keep head steady when held in a sitting position; Supports self on elbows and wrists when on stomach  Rolls over from stomach to back  Fine Motor  Opens and shuts hands; briefly brings hands together; Fine Motor  Keeps hands unfisted; plays with fingers in midline; grasps objects  [de-identified] : ortho- no f/u needed  [de-identified] : On back in crib [de-identified] : n/a [de-identified] : n/a

## 2025-07-10 NOTE — PHYSICAL EXAM
[Pink] : pink [Well Perfused] : well perfused [No Rashes] : no rashes [No Birth Marks] : no birth marks [Conjunctiva Clear] : conjunctiva clear [PERRL] : pupils were equal, round, reactive to light  [Ears Normal Position and Shape] : normal position and shape of ears [Nares Patent] : nares patent [No Nasal Flaring] : no nasal flaring [Moist and Pink Mucous Membranes] : moist and pink mucous membranes [Palate Intact] : palate intact [No Torticollis] : no torticollis [No Neck Masses] : no neck masses [Symmetric Expansion] : symmetric chest expansion [No Retractions] : no retractions [Clear to Auscultation] : lungs clear to auscultation  [Normal S1, S2] : normal S1 and S2 [Regular Rhythm] : regular rhythm [No Murmur] : no mumur [Normal Pulses] : normal pulses [Non Distended] : non distended [No HSM] : no hepatosplenomegaly appreciated [No Masses] : no masses were palpated [Normal Bowel Sounds] : normal bowel sounds [No Umbilical Hernia] : no umbilical hernia [Normal Genitalia] : normal genitalia [No Sacral Dimples] : no sacral dimples [No Scoliosis] : no scoliosis [Normal Range of Motion] : normal range of motion [Normal Posture] : normal posture [No evidence of Hip Dislocation] : no evidence of hip dislocation [Active and Alert] : active and alert [Normal truncal tone] : normal truncal tone [Normal deep tendon reflexes] : normal deep tendon reflexes [No head lag] : no head lag [ATNR] : tonic neck reflex was ~L asymmetrical [Follows 180 Degrees] : visual track 180 degrees [Smiles Sociallly] : has a social smile [[place]] : coos [Lifts Head And Chest 45 degress in Prone] : lifts the head and chest 45 degress in prone [Weight Shifts in Prone] : weight shifts in prone [Reaches For Objects in Prone] : does not reach for objects in prone [Sits With Support] : sits with support [Hands Open] : the hands open [Brings Hands to Mouth] : brings hands to mouth [Brings Hands to Midline] : brings hands to midline [Brings Objects to Mouth] : does not bring objects to mouth [de-identified] : mild increased tone i extremities [de-identified] : emerging roll from front to back

## 2025-07-10 NOTE — PHYSICAL EXAM
[Pink] : pink [Well Perfused] : well perfused [No Rashes] : no rashes [No Birth Marks] : no birth marks [Conjunctiva Clear] : conjunctiva clear [PERRL] : pupils were equal, round, reactive to light  [Ears Normal Position and Shape] : normal position and shape of ears [Nares Patent] : nares patent [No Nasal Flaring] : no nasal flaring [Moist and Pink Mucous Membranes] : moist and pink mucous membranes [Palate Intact] : palate intact [No Torticollis] : no torticollis [No Neck Masses] : no neck masses [Symmetric Expansion] : symmetric chest expansion [No Retractions] : no retractions [Clear to Auscultation] : lungs clear to auscultation  [Normal S1, S2] : normal S1 and S2 [Regular Rhythm] : regular rhythm [No Murmur] : no mumur [Normal Pulses] : normal pulses [Non Distended] : non distended [No HSM] : no hepatosplenomegaly appreciated [No Masses] : no masses were palpated [Normal Bowel Sounds] : normal bowel sounds [No Umbilical Hernia] : no umbilical hernia [Normal Genitalia] : normal genitalia [No Sacral Dimples] : no sacral dimples [No Scoliosis] : no scoliosis [Normal Range of Motion] : normal range of motion [Normal Posture] : normal posture [No evidence of Hip Dislocation] : no evidence of hip dislocation [Active and Alert] : active and alert [Normal truncal tone] : normal truncal tone [Normal deep tendon reflexes] : normal deep tendon reflexes [No head lag] : no head lag [ATNR] : tonic neck reflex was ~L asymmetrical [Follows 180 Degrees] : visual track 180 degrees [Smiles Sociallly] : has a social smile [[place]] : coos [Lifts Head And Chest 45 degress in Prone] : lifts the head and chest 45 degress in prone [Weight Shifts in Prone] : weight shifts in prone [Reaches For Objects in Prone] : does not reach for objects in prone [Sits With Support] : sits with support [Hands Open] : the hands open [Brings Hands to Mouth] : brings hands to mouth [Brings Hands to Midline] : brings hands to midline [Brings Objects to Mouth] : does not bring objects to mouth [de-identified] : mild increased tone i extremities [de-identified] : emerging roll from front to back

## 2025-07-10 NOTE — PHYSICAL EXAM
[Pink] : pink [Well Perfused] : well perfused [No Rashes] : no rashes [No Birth Marks] : no birth marks [Conjunctiva Clear] : conjunctiva clear [PERRL] : pupils were equal, round, reactive to light  [Ears Normal Position and Shape] : normal position and shape of ears [Nares Patent] : nares patent [No Nasal Flaring] : no nasal flaring [Moist and Pink Mucous Membranes] : moist and pink mucous membranes [Palate Intact] : palate intact [No Torticollis] : no torticollis [No Neck Masses] : no neck masses [Symmetric Expansion] : symmetric chest expansion [No Retractions] : no retractions [Clear to Auscultation] : lungs clear to auscultation  [Normal S1, S2] : normal S1 and S2 [Regular Rhythm] : regular rhythm [No Murmur] : no mumur [Normal Pulses] : normal pulses [Non Distended] : non distended [No HSM] : no hepatosplenomegaly appreciated [No Masses] : no masses were palpated [Normal Bowel Sounds] : normal bowel sounds [No Umbilical Hernia] : no umbilical hernia [Normal Genitalia] : normal genitalia [No Sacral Dimples] : no sacral dimples [No Scoliosis] : no scoliosis [Normal Range of Motion] : normal range of motion [Normal Posture] : normal posture [No evidence of Hip Dislocation] : no evidence of hip dislocation [Active and Alert] : active and alert [Normal truncal tone] : normal truncal tone [Normal deep tendon reflexes] : normal deep tendon reflexes [No head lag] : no head lag [ATNR] : tonic neck reflex was ~L asymmetrical [Follows 180 Degrees] : visual track 180 degrees [Smiles Sociallly] : has a social smile [[place]] : coos [Lifts Head And Chest 45 degress in Prone] : lifts the head and chest 45 degress in prone [Weight Shifts in Prone] : weight shifts in prone [Reaches For Objects in Prone] : does not reach for objects in prone [Sits With Support] : sits with support [Hands Open] : the hands open [Brings Hands to Mouth] : brings hands to mouth [Brings Hands to Midline] : brings hands to midline [Brings Objects to Mouth] : does not bring objects to mouth [de-identified] : mild increased tone i extremities [de-identified] : emerging roll from front to back

## 2025-07-10 NOTE — BIRTH HISTORY
[de-identified] : 30wk di-di TWIN B male born via unscheduled CS 2/2  labor, breech to a 38y/o  blood type A+ mother. Maternal history of anemia (on iron), hypothyroidism (on levothyroxine), insufficient cervix (on progesterone), SMA carrier. Prenatal history of IVF pregnancy. Mother received beta (complete), antibiotics, magnesium. PNL HIV -/Hep B-/RPR non-reactive/Rubella immune, urine GBS +/ on 24. AROM @time of delivery with clear fluids. NICU called for prematurity, twin delivery, breech presentation. Baby emerged breech vigorous, crying, was w/d/s/s with APGARS of 7/8, resuscitation included CPAP 5/30%, deep suction, admitted to NICU for prematurity and CPAP requirement. Highest maternal temp 36.7C, EOS n/a. [de-identified] : Prematurity Di-Di Twin Gestation Breech delivery Respiratory Failure due roger RDS AOP Hyperbilirubinemia

## 2025-07-10 NOTE — PATIENT INSTRUCTIONS
[FreeTextEntry1] : F/U Pediatric developmental Clinic on 9/15/25  F/U Eye MD on 9/17/25 [FreeTextEntry2] : Home exercises discussed [FreeTextEntry3] : Not recommended [FreeTextEntry4] : Continue Enfacare 22 hellen formula. [FreeTextEntry5] : Stop Iron, continue polyvisol (multivitamin) [FreeTextEntry8] : PMD to do [de-identified] : Skin care instructions provided.  Aquaphor for dry skin, use only unscented baby care products, avoid direct sunlight especially during the summer montths

## 2025-07-10 NOTE — ASSESSMENT
[FreeTextEntry1] : BRIDGET CHACON is a 30.3 week gestation infant, Twin B, now chronologic age 5 mos. 18 days, corrected age 3 mos. 10 days seen in  follow-up. Pertinent NICU history includes Prematurity, 30 wks., Di-Di Twin Gestation, Breech Delivery, Respiratory Failure due to RDS, Apnea of prematurity, Hyperbilirubinemia.  The following issues were addressed at this visit.  Growth and nutrition: Weight gain has been  33g/d for 84  days and plots at the 34th percentile for corrected age.  Head growth and length are at the 83rd and 81st percentiles respectively.  Baby is currently feeding Enfacare 22 and the plan is to continue this  until 1year corrected because of prematurity . Due to prematurity, solid foods are not recommended until 5-6 months corrected age with good head control. Continue vitamin supplements.  Development/neuro: baby has developmental delay for chronologic age, was seen by OT today and given home exercises to do. baby has mild increased tone in extremities, exercises given.  Early Intervention is not needed at this time.  Baby will follow-up with pediatric developmental in 09/15/2025 .   Anemia: Baby has been on iron supplements, will dc due to amount of formula being taken. Hct reviewed and is appropriate for age.  ROP: Baby is at risk for ROP and other ophthalmologic complications due to prematurity and will follow with ophthalmology 2025  . Parents informed of importance of ophtho follow-up.    Breech presentation at birth: Infant is at risk for developmental dysplasia of the the hips. Hip US on 25 reviewed and is normal.   Other:   Health maintenance: Reviewed routine vaccination schedule with parent as well as guidance for flu vaccine for family, COVID-19 precautions, and need for PMD f/u.  Also discussed bathing and skin care recommendations.   Reviewed notes by ortho   Next neonatology f/u: not required.

## 2025-07-10 NOTE — DISCUSSION/SUMMARY
[GA at Birth: ___] : GA at Birth: [unfilled] [Chronological Age: ___] : Chronological Age: [unfilled] [Corrected Age: ___] : Corrected Age: [unfilled] [Alert] : alert [Social/Interactive] : social/interactive [Head in midline] : head in midline [Hands to midline] : hands to midline [Turns head side to side] : turns head side to side [Picks up head and props on elbows] : picks up head and props on elbows [Active] : prone to supine (2-5 months) - Active [Assist] : supine to prone (6 months) - Assist [Fair] : head control is fair [Ribs] : at ribs [>] : > [Tracking moving objects (4-7 months)] : tracking moving objects (4-7 months) [] : no [Maintains eye contact with family during palyful interaction] : maintains eye contact with family during playful interaction [Generally happy when all needs met] : generally happy when all needs are met [Sitting] : sitting [Rolling] : rolling [FreeTextEntry1] : Prematurity, twin, breech [FreeTextEntry6] : mildly decreased throughout [FreeTextEntry3] : Infant seen this am in  followup clinic with infant's parents. Presents with overall strength, ROM, tone, and GM skills however does not yet swat and requires assist at axilla/ribs to sit but is appropriate for corrected age. Demonstrates age appropriate state regulation skills.  Reviewed MLO, visual activities, auditory stim, positioning in supine, awake tummy time, awake SL R/L.  Parents provided with handouts with good understanding. No EI recommended at this time. Follow up at NICU clinic as per MD recs.

## 2025-07-10 NOTE — PATIENT INSTRUCTIONS
[FreeTextEntry1] : F/U Pediatric developmental Clinic on 9/15/25  F/U Eye MD on 9/17/25 [FreeTextEntry2] : Home exercises discussed [FreeTextEntry3] : Not recommended [FreeTextEntry4] : Continue Enfacare 22 hellen formula. [FreeTextEntry5] : Stop Iron, continue polyvisol (multivitamin) [FreeTextEntry8] : PMD to do [de-identified] : Skin care instructions provided.  Aquaphor for dry skin, use only unscented baby care products, avoid direct sunlight especially during the summer montths

## 2025-07-10 NOTE — REVIEW OF SYSTEMS
POCUS: Emergency Department Focused Ultrasound performed at patient's bedside.  The complete report will be available in PACS. [Nl] : Integumentary [Immunizations are up to date] : Immunizations are up to date

## 2025-07-10 NOTE — BIRTH HISTORY
[de-identified] : 30wk di-di TWIN B male born via unscheduled CS 2/2  labor, breech to a 38y/o  blood type A+ mother. Maternal history of anemia (on iron), hypothyroidism (on levothyroxine), insufficient cervix (on progesterone), SMA carrier. Prenatal history of IVF pregnancy. Mother received beta (complete), antibiotics, magnesium. PNL HIV -/Hep B-/RPR non-reactive/Rubella immune, urine GBS +/ on 24. AROM @time of delivery with clear fluids. NICU called for prematurity, twin delivery, breech presentation. Baby emerged breech vigorous, crying, was w/d/s/s with APGARS of 7/8, resuscitation included CPAP 5/30%, deep suction, admitted to NICU for prematurity and CPAP requirement. Highest maternal temp 36.7C, EOS n/a. [de-identified] : Prematurity Di-Di Twin Gestation Breech delivery Respiratory Failure due roger RDS AOP Hyperbilirubinemia

## 2025-07-10 NOTE — HISTORY OF PRESENT ILLNESS
[Car seat use according to directions] : car seat used according to directions [No Feeding Issues] : no feeding issues at this time [___Formula] : [unfilled] [___ ounces/feeding] : ~BRANDI rebolledo/feeding [___ Times/day] : [unfilled] times/day [_____ Times Per] : Stool frequency occurs [unfilled] times per  [Day] : day [Variable amount] : variable  [Soft] : soft [Solid Foods] : no solid food at this time [Bloody] : not bloody [Mucousy] : no mucous [de-identified] : [place] [facility] [place]: No hospitalizations  Diet: Feeding Enfacare 22, 4oz; 10 feeds per day.  Diapers - 1 stool per day, no blood or mucus Car seat rear facing Sleeping in crib on back. [de-identified] : NRE 5  No EI F/U NICU Grad Clinic & Developmental Peds clinic Social Language and Self-help  Smiles responsively; makes sounds that show happiness/upset; Laughs aloud  Looks for parent or another caregiver when upset  Verbal Language (Expressive and Receptive)  Makes short cooing sounds; Turns to voices  Makes extended cooing sounds  Gross Motor  Does not Lifts head and chest when on stomach  Does not Keep head steady when held in a sitting position; Supports self on elbows and wrists when on stomach  Rolls over from stomach to back  Fine Motor  Opens and shuts hands; briefly brings hands together; Fine Motor  Keeps hands unfisted; plays with fingers in midline; grasps objects  [de-identified] : ortho- no f/u needed  [de-identified] : On back in crib [de-identified] : n/a [de-identified] : n/a

## 2025-07-10 NOTE — HISTORY OF PRESENT ILLNESS
[Car seat use according to directions] : car seat used according to directions [No Feeding Issues] : no feeding issues at this time [___Formula] : [unfilled] [___ ounces/feeding] : ~BRANDI rebolledo/feeding [___ Times/day] : [unfilled] times/day [_____ Times Per] : Stool frequency occurs [unfilled] times per  [Day] : day [Variable amount] : variable  [Soft] : soft [Solid Foods] : no solid food at this time [Bloody] : not bloody [Mucousy] : no mucous [de-identified] : D/C [facility] [place] Interval Events: No hospitalizations  Diet: Feeding Enfacare 22, 4oz; 10 feeds per day.  Diapers - 1 stool per day, no blood or mucus Car seat rear facing Sleeping in crib on back. [de-identified] : NRE 5  No EI F/U [facility] Development[facility] Peds clinic Social Language and Self-help  Smiles responsively; makes sounds that show happiness/upset; Laughs aloud  Looks for parent or another caregiver when upset  Verbal Language (Expressive and Receptive)  Makes short cooing sounds; Turns to voices  Makes extended cooing sounds  Gross Motor  Does not Lifts head and chest when on stomach  Does not Keep head steady when held in a sitting position; Supports self on elbows and wrists when on stomach  Rolls over from stomach to back  Fine Motor  Opens and shuts hands; briefly brings hands together; Fine Motor  Keeps hands unfisted; plays with fingers in midline; grasps objects  [de-identified] : ortho- no f/u needed  [de-identified] : On back in crib [de-identified] : n/a [de-identified] : n/a

## 2025-07-10 NOTE — PATIENT INSTRUCTIONS
[FreeTextEntry1] : F/U Pediatric developmental Clinic on 9/15/25  F/U Eye MD on 9/17/25 [FreeTextEntry2] : Home exercises discussed [FreeTextEntry3] : Not recommended [FreeTextEntry4] : Continue Enfacare 22 hellen formula. [FreeTextEntry5] : Stop Iron, continue polyvisol (multivitamin) [FreeTextEntry8] : PMD to do [de-identified] : Skin care instructions provided.  Aquaphor for dry skin, use only unscented baby care products, avoid direct sunlight especially during the summer montths